# Patient Record
Sex: FEMALE | Race: WHITE
[De-identification: names, ages, dates, MRNs, and addresses within clinical notes are randomized per-mention and may not be internally consistent; named-entity substitution may affect disease eponyms.]

---

## 2017-01-04 PROBLEM — R41.3 MEMORY LOSS: Status: ACTIVE | Noted: 2017-01-04

## 2017-01-04 PROBLEM — I10 ESSENTIAL HYPERTENSION: Status: ACTIVE | Noted: 2017-01-04

## 2017-10-10 PROBLEM — H90.3 SENSORINEURAL HEARING LOSS, BILATERAL: Status: ACTIVE | Noted: 2017-10-10

## 2018-03-14 PROBLEM — E55.9 VITAMIN D DEFICIENCY: Status: ACTIVE | Noted: 2018-03-14

## 2018-03-16 PROCEDURE — 81001 URINALYSIS AUTO W/SCOPE: CPT | Performed by: INTERNAL MEDICINE

## 2018-03-16 PROCEDURE — 87086 URINE CULTURE/COLONY COUNT: CPT | Performed by: INTERNAL MEDICINE

## 2018-04-03 PROBLEM — M17.11 PRIMARY OSTEOARTHRITIS OF RIGHT KNEE: Status: ACTIVE | Noted: 2018-04-03

## 2019-01-08 PROBLEM — F41.9 ANXIETY AND DEPRESSION: Status: ACTIVE | Noted: 2019-01-08

## 2019-01-08 PROBLEM — M17.11 PRIMARY OSTEOARTHRITIS OF RIGHT KNEE: Status: RESOLVED | Noted: 2018-04-03 | Resolved: 2019-01-08

## 2019-01-08 PROBLEM — F32.A ANXIETY AND DEPRESSION: Status: ACTIVE | Noted: 2019-01-08

## 2019-07-17 ENCOUNTER — HOSPITAL (OUTPATIENT)
Dept: OTHER | Age: 79
End: 2019-07-17

## 2019-09-24 PROBLEM — G30.1 LATE ONSET ALZHEIMER'S DISEASE WITHOUT BEHAVIORAL DISTURBANCE (HCC): Status: ACTIVE | Noted: 2019-09-24

## 2019-09-24 PROBLEM — F02.80 LATE ONSET ALZHEIMER'S DISEASE WITHOUT BEHAVIORAL DISTURBANCE (HCC): Status: ACTIVE | Noted: 2019-09-24

## 2020-01-13 PROBLEM — R41.3 MEMORY LOSS: Status: RESOLVED | Noted: 2017-01-04 | Resolved: 2020-01-13

## 2020-06-02 ENCOUNTER — APPOINTMENT (OUTPATIENT)
Dept: CT IMAGING | Facility: HOSPITAL | Age: 80
End: 2020-06-02
Attending: EMERGENCY MEDICINE
Payer: MEDICARE

## 2020-06-02 ENCOUNTER — HOSPITAL ENCOUNTER (EMERGENCY)
Facility: HOSPITAL | Age: 80
Discharge: HOME | End: 2020-06-02
Attending: EMERGENCY MEDICINE
Payer: MEDICARE

## 2020-06-02 ENCOUNTER — TELEPHONE (OUTPATIENT)
Dept: OTOLARYNGOLOGY | Facility: CLINIC | Age: 80
End: 2020-06-02

## 2020-06-02 VITALS
RESPIRATION RATE: 16 BRPM | DIASTOLIC BLOOD PRESSURE: 80 MMHG | OXYGEN SATURATION: 99 % | WEIGHT: 230.81 LBS | TEMPERATURE: 98 F | HEIGHT: 64 IN | BODY MASS INDEX: 39.4 KG/M2 | SYSTOLIC BLOOD PRESSURE: 140 MMHG | HEART RATE: 72 BPM

## 2020-06-02 DIAGNOSIS — S01.21XA NASAL LACERATION, INITIAL ENCOUNTER: ICD-10-CM

## 2020-06-02 DIAGNOSIS — S00.83XA CONTUSION OF FACE, INITIAL ENCOUNTER: Primary | ICD-10-CM

## 2020-06-02 DIAGNOSIS — S02.85XA ORBITAL FRACTURE, CLOSED, INITIAL ENCOUNTER (HCC): ICD-10-CM

## 2020-06-02 DIAGNOSIS — R04.0 LEFT-SIDED EPISTAXIS: ICD-10-CM

## 2020-06-02 DIAGNOSIS — S02.2XXB OPEN FRACTURE OF NASAL BONE, INITIAL ENCOUNTER: ICD-10-CM

## 2020-06-02 PROCEDURE — 30903 CONTROL OF NOSEBLEED: CPT

## 2020-06-02 PROCEDURE — 70450 CT HEAD/BRAIN W/O DYE: CPT | Performed by: EMERGENCY MEDICINE

## 2020-06-02 PROCEDURE — 21310 HC CLOSED TX NASAL BONE FX WITHOUT MANIPULATION: CPT

## 2020-06-02 PROCEDURE — 12014 RPR F/E/E/N/L/M 5.1-7.5 CM: CPT

## 2020-06-02 PROCEDURE — 70486 CT MAXILLOFACIAL W/O DYE: CPT | Performed by: EMERGENCY MEDICINE

## 2020-06-02 PROCEDURE — 90471 IMMUNIZATION ADMIN: CPT

## 2020-06-02 PROCEDURE — 99284 EMERGENCY DEPT VISIT MOD MDM: CPT

## 2020-06-02 RX ORDER — CLINDAMYCIN HYDROCHLORIDE 300 MG/1
300 CAPSULE ORAL 3 TIMES DAILY
Qty: 21 CAPSULE | Refills: 0 | Status: SHIPPED | OUTPATIENT
Start: 2020-06-02 | End: 2020-06-09

## 2020-06-02 RX ORDER — CLINDAMYCIN HYDROCHLORIDE 300 MG/1
300 CAPSULE ORAL ONCE
Status: COMPLETED | OUTPATIENT
Start: 2020-06-02 | End: 2020-06-02

## 2020-06-02 RX ORDER — LIDOCAINE HYDROCHLORIDE 10 MG/ML
20 INJECTION, SOLUTION EPIDURAL; INFILTRATION; INTRACAUDAL; PERINEURAL ONCE
Status: COMPLETED | OUTPATIENT
Start: 2020-06-02 | End: 2020-06-02

## 2020-06-02 RX ORDER — TRANEXAMIC ACID 100 MG/ML
10 INJECTION, SOLUTION INTRAVENOUS ONCE
Status: COMPLETED | OUTPATIENT
Start: 2020-06-02 | End: 2020-06-02

## 2020-06-02 RX ORDER — LIDOCAINE HYDROCHLORIDE 10 MG/ML
INJECTION, SOLUTION EPIDURAL; INFILTRATION; INTRACAUDAL; PERINEURAL
Status: COMPLETED
Start: 2020-06-02 | End: 2020-06-02

## 2020-06-02 RX ORDER — LIDOCAINE HYDROCHLORIDE AND EPINEPHRINE 20; 5 MG/ML; UG/ML
20 INJECTION, SOLUTION EPIDURAL; INFILTRATION; INTRACAUDAL; PERINEURAL ONCE
Status: COMPLETED | OUTPATIENT
Start: 2020-06-02 | End: 2020-06-02

## 2020-06-02 NOTE — ED NOTES
Care assumed from triage Ambulates to room with steady gait Alert and interactive Presents after mechanical fall while ascending carpeted  stairs striking nose on riser + L nare epistaxis L lateral irregular 3cm laceration + 1cm laceration at tip of nose B

## 2020-06-02 NOTE — ED INITIAL ASSESSMENT (HPI)
Mechanical fall from standing, has bleeding from nose, as well as two lacs to nose. Denies LOC. States she hit a carpeted stair.

## 2020-06-02 NOTE — TELEPHONE ENCOUNTER
Daughter calling for appt for pt had a fall and was seen in ER needs nose packing removal and evaluation requesting appt.  Pt nose packing is very bloody right now please advise

## 2020-06-02 NOTE — ED NOTES
L nare rhinorocket intact No active bleeding. Suture line care with mustache/lateral lac DSD applied. Instructions reviewed with return understanding  From  Osvaldo Rizzo Discharged home with plan to follow up with ENT/Opthomology as indicated.  Alert and inte

## 2020-06-02 NOTE — ED PROVIDER NOTES
Patient Seen in: [facility] AND [facility] Emergency Department    History   Patient presents with:  Fall    Stated Complaint: fall, nose injury     HPI    47-year-old female with past medical history of hypertension, dyslipidemia, hypothyroid presenting for evalu Diclofenac Sodium 1 % Transdermal Gel,  Apply 2 g topically 3 (three) times daily as needed (knee pain). Sertraline HCl 100 MG Oral Tab,  Take 2 tablets (200 mg total) by mouth daily.    furosemide 20 MG Oral Tab,  One daily PRN swelling   Multiple Vitami Temp 98.2 °F (36.8 °C) (Oral)   Resp 18   Ht 162.6 cm (5' 4\")   Wt 104.7 kg   LMP  (LMP Unknown)   SpO2 99%   BMI 39.62 kg/m²         Physical Exam   Constitutional: No distress. HEENT: MMM.  Nasal bridge with superficial abrasions; left lateral nasal b right lateral nasal bridge was anesthetized in the usual fashion. The wound was scrubbed, draped and explored to its base with a gloved finger. There were no deep structures involved. No tendon injury was identified.    The wound was repaired with 5-0 prol well-aerated. The mastoids are unremarkable. FACE/ORBITS:    Soft tissue swelling and bubbles of gas involving the forehead and paranasal region.   Severely comminuted nasal fracture involving the bridge and both lateral walls with angulation and mil follow-up. Patient/family comfortable with plan of care and understanding of warning structures for emergent return and understanding of need for ongoing specialist follow-up.     I was wearing at minimum a facemask and eye protection throughout this encou

## 2020-06-02 NOTE — TELEPHONE ENCOUNTER
Pt had rhino rocket placed on 06/01, pt is not on any blood thinners, per pt  no bleeding. Pt scheduled for follow up with [facility].

## 2020-06-08 ENCOUNTER — OFFICE VISIT (OUTPATIENT)
Dept: OTOLARYNGOLOGY | Facility: CLINIC | Age: 80
End: 2020-06-08
Payer: MEDICARE

## 2020-06-08 VITALS
WEIGHT: 230.81 LBS | HEIGHT: 64 IN | BODY MASS INDEX: 39.4 KG/M2 | DIASTOLIC BLOOD PRESSURE: 61 MMHG | TEMPERATURE: 98 F | SYSTOLIC BLOOD PRESSURE: 105 MMHG

## 2020-06-08 DIAGNOSIS — R04.0 EPISTAXIS: Primary | ICD-10-CM

## 2020-06-08 DIAGNOSIS — S09.93XA FACIAL INJURY, INITIAL ENCOUNTER: ICD-10-CM

## 2020-06-08 PROCEDURE — G0463 HOSPITAL OUTPT CLINIC VISIT: HCPCS | Performed by: OTOLARYNGOLOGY

## 2020-06-08 PROCEDURE — 99203 OFFICE O/P NEW LOW 30 MIN: CPT | Performed by: OTOLARYNGOLOGY

## 2020-06-08 NOTE — PROGRESS NOTES
Eva Bustillos is a 78year old female. Patient presents with:  Epistaxis: ED follow up for nose packing removal on 6/2/2020      HISTORY OF PRESENT ILLNESS  6/8/2020  Patient prevents for evaluation of nosebleeds. This is not recurrent.  Recent bleeding file        Attends meetings of clubs or organizations: Not on file        Relationship status: Not on file      Intimate partner violence:        Fear of current or ex partner: Not on file        Emotionally abused: Not on file        Physically abused: N Negative Frequent skin infections, pigment change and rash. Hema/Lymph Negative Easy bleeding and easy bruising.            PHYSICAL EXAM    Temp:  [97.9 °F (36.6 °C)] 97.9 °F (36.6 °C)  BP: (105)/(61) 105/61        Constitutional Normal Overall appearanc ONE TIME DAILY, Disp: 90 tablet, Rfl: 0  •  ALENDRONATE 35 MG Oral Tab, TAKE ONE TABLET BY MOUTH EVERY 7 DAYS., Disp: 12 tablet, Rfl: 0  •  ATORVASTATIN 20 MG Oral Tab, TAKE ONE TABLET BY MOUTH ONE TIME DAILY , Disp: 90 tablet, Rfl: 1  •  OXYBUTYNIN CHLORI thinners, nose picking, and dry conditions. I emphasized the need for humidity and avoidance of noseblowing when the bleeding occurs. .  Recommend: ointment twice a day for 7 days intranasally,  humidifier, avoidance of nose blowing and avoidance of anti-in

## 2020-08-17 ENCOUNTER — OFFICE VISIT (OUTPATIENT)
Dept: OTOLARYNGOLOGY | Facility: CLINIC | Age: 80
End: 2020-08-17
Payer: MEDICARE

## 2020-08-17 VITALS
WEIGHT: 230.81 LBS | BODY MASS INDEX: 39.4 KG/M2 | HEIGHT: 64 IN | DIASTOLIC BLOOD PRESSURE: 61 MMHG | TEMPERATURE: 97 F | SYSTOLIC BLOOD PRESSURE: 96 MMHG

## 2020-08-17 DIAGNOSIS — T81.89XA RETAINED SUTURE, INITIAL ENCOUNTER: Primary | ICD-10-CM

## 2020-08-17 DIAGNOSIS — Z18.9 RETAINED SUTURE, INITIAL ENCOUNTER: Primary | ICD-10-CM

## 2020-08-17 PROCEDURE — 99212 OFFICE O/P EST SF 10 MIN: CPT | Performed by: OTOLARYNGOLOGY

## 2020-08-17 PROCEDURE — G0463 HOSPITAL OUTPT CLINIC VISIT: HCPCS | Performed by: OTOLARYNGOLOGY

## 2020-08-17 NOTE — PROGRESS NOTES
Contreras Redmond is a 78year old female. Patient presents with: Follow - Up: nasal injury happened on 6/20      HISTORY OF PRESENT ILLNESS  6/8/2020  Patient prevents for evaluation of nosebleeds. This is not recurrent.  Recent bleeding last  started exce file        Minutes per session: Not on file      Stress: Not on file    Relationships      Social connections:        Talks on phone: Not on file        Gets together: Not on file        Attends [employer] service: Not on file        Active member of club Dyspnea and wheezing. Cardio Negative Chest pain, irregular heartbeat/palpitations and syncope. GI Negative Abdominal pain and diarrhea. Endocrine Negative Cold intolerance and heat intolerance. Neuro Negative Tremors.    Psych Negative Anxiety and Tab, TAKE ONE TABLET BY MOUTH TWICE DAILY , Disp: 180 tablet, Rfl: 1  •  DONEPEZIL HCL 10 MG Oral Tab, TAKE ONE TABLET BY MOUTH NIGHTLY, Disp: 90 tablet, Rfl: 3  •  LEVOTHYROXINE SODIUM 88 MCG Oral Tab, TAKE ONE TABLET BY MOUTH ONE TIME DAILY, Disp: 90 tab

## 2021-03-09 PROBLEM — I35.0 NONRHEUMATIC AORTIC VALVE STENOSIS: Status: ACTIVE | Noted: 2021-03-09

## 2023-11-29 ENCOUNTER — INITIAL APN SNF VISIT (OUTPATIENT)
Dept: INTERNAL MEDICINE CLINIC | Age: 83
End: 2023-11-29

## 2023-11-29 ENCOUNTER — APPOINTMENT (OUTPATIENT)
Dept: LAB | Age: 83
End: 2023-11-29
Attending: NURSE PRACTITIONER
Payer: MEDICARE

## 2023-11-29 VITALS
DIASTOLIC BLOOD PRESSURE: 57 MMHG | BODY MASS INDEX: 35 KG/M2 | OXYGEN SATURATION: 93 % | SYSTOLIC BLOOD PRESSURE: 102 MMHG | RESPIRATION RATE: 16 BRPM | HEART RATE: 72 BPM | TEMPERATURE: 99 F | WEIGHT: 186.38 LBS

## 2023-11-29 DIAGNOSIS — E06.3 ACQUIRED AUTOIMMUNE HYPOTHYROIDISM: ICD-10-CM

## 2023-11-29 DIAGNOSIS — M25.561 CHRONIC PAIN OF RIGHT KNEE: Primary | ICD-10-CM

## 2023-11-29 DIAGNOSIS — Z78.9 DECREASED ACTIVITIES OF DAILY LIVING (ADL): ICD-10-CM

## 2023-11-29 DIAGNOSIS — F32.A ANXIETY AND DEPRESSION: ICD-10-CM

## 2023-11-29 DIAGNOSIS — I10 ESSENTIAL HYPERTENSION: ICD-10-CM

## 2023-11-29 DIAGNOSIS — G30.1 LATE ONSET ALZHEIMER'S DISEASE WITHOUT BEHAVIORAL DISTURBANCE (HCC): ICD-10-CM

## 2023-11-29 DIAGNOSIS — R53.1 WEAKNESS: ICD-10-CM

## 2023-11-29 DIAGNOSIS — E87.6 HYPOKALEMIA: ICD-10-CM

## 2023-11-29 DIAGNOSIS — F02.80 LATE ONSET ALZHEIMER'S DISEASE WITHOUT BEHAVIORAL DISTURBANCE (HCC): ICD-10-CM
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
## 2023-11-29 DIAGNOSIS — G89.29 CHRONIC PAIN OF RIGHT KNEE: Primary | ICD-10-CM

## 2023-11-29 DIAGNOSIS — F41.9 ANXIETY AND DEPRESSION: ICD-10-CM

## 2023-11-29 PROCEDURE — 99310 SBSQ NF CARE HIGH MDM 45: CPT | Performed by: NURSE PRACTITIONER

## 2023-11-29 PROCEDURE — 1124F ACP DISCUSS-NO DSCNMKR DOCD: CPT | Performed by: NURSE PRACTITIONER

## 2023-11-29 RX ORDER — POTASSIUM CHLORIDE 1500 MG/1
20 TABLET, EXTENDED RELEASE ORAL
COMMUNITY

## 2023-11-29 RX ORDER — POLYETHYLENE GLYCOL 3350 17 G/17G
17 POWDER, FOR SOLUTION ORAL DAILY PRN
COMMUNITY

## 2023-11-29 RX ORDER — DOCUSATE SODIUM 100 MG/1
100 CAPSULE, LIQUID FILLED ORAL 2 TIMES DAILY
COMMUNITY

## 2023-11-29 RX ORDER — ACETAMINOPHEN 325 MG/1
650 TABLET ORAL EVERY 6 HOURS PRN
COMMUNITY

## 2023-11-29 RX ORDER — LIDOCAINE 50 MG/G
1 PATCH TOPICAL EVERY 24 HOURS
COMMUNITY

## 2023-11-29 RX ORDER — TRAMADOL HYDROCHLORIDE 50 MG/1
50 TABLET ORAL EVERY 6 HOURS PRN
COMMUNITY

## 2023-12-01 ENCOUNTER — APPOINTMENT (OUTPATIENT)
Dept: LAB | Age: 83
End: 2023-12-01
Attending: FAMILY MEDICINE
Payer: MEDICARE

## 2023-12-06 ENCOUNTER — SNF VISIT (OUTPATIENT)
Dept: INTERNAL MEDICINE CLINIC | Age: 83
End: 2023-12-06

## 2023-12-06 VITALS
WEIGHT: 188 LBS | TEMPERATURE: 98 F | RESPIRATION RATE: 18 BRPM | DIASTOLIC BLOOD PRESSURE: 64 MMHG | OXYGEN SATURATION: 95 % | BODY MASS INDEX: 36 KG/M2 | SYSTOLIC BLOOD PRESSURE: 132 MMHG | HEART RATE: 70 BPM

## 2023-12-06 DIAGNOSIS — R53.1 WEAKNESS: ICD-10-CM

## 2023-12-06 DIAGNOSIS — I10 ESSENTIAL HYPERTENSION: ICD-10-CM
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
## 2023-12-06 DIAGNOSIS — G30.1 LATE ONSET ALZHEIMER'S DISEASE WITHOUT BEHAVIORAL DISTURBANCE (HCC): ICD-10-CM

## 2023-12-06 DIAGNOSIS — Z78.9 DECREASED ACTIVITIES OF DAILY LIVING (ADL): ICD-10-CM

## 2023-12-06 DIAGNOSIS — G89.29 CHRONIC PAIN OF RIGHT KNEE: Primary | ICD-10-CM

## 2023-12-06 DIAGNOSIS — F02.80 LATE ONSET ALZHEIMER'S DISEASE WITHOUT BEHAVIORAL DISTURBANCE (HCC): ICD-10-CM

## 2023-12-06 DIAGNOSIS — K59.00 CONSTIPATION, UNSPECIFIED CONSTIPATION TYPE: ICD-10-CM

## 2023-12-06 DIAGNOSIS — M25.561 CHRONIC PAIN OF RIGHT KNEE: Primary | ICD-10-CM

## 2023-12-06 PROCEDURE — 99310 SBSQ NF CARE HIGH MDM 45: CPT | Performed by: NURSE PRACTITIONER

## 2023-12-09 ENCOUNTER — APPOINTMENT (OUTPATIENT)
Dept: LAB | Age: 83
End: 2023-12-09
Attending: FAMILY MEDICINE
Payer: MEDICARE

## 2023-12-14 ENCOUNTER — APPOINTMENT (OUTPATIENT)
Dept: LAB | Age: 83
End: 2023-12-14
Attending: FAMILY MEDICINE
Payer: MEDICARE

## 2023-12-15 ENCOUNTER — APPOINTMENT (OUTPATIENT)
Dept: LAB | Age: 83
End: 2023-12-15
Attending: FAMILY MEDICINE
Payer: MEDICARE

## 2023-12-20 ENCOUNTER — SNF VISIT (OUTPATIENT)
Dept: INTERNAL MEDICINE CLINIC | Age: 83
End: 2023-12-20

## 2023-12-20 VITALS
SYSTOLIC BLOOD PRESSURE: 107 MMHG | RESPIRATION RATE: 18 BRPM | OXYGEN SATURATION: 96 % | WEIGHT: 190 LBS | DIASTOLIC BLOOD PRESSURE: 67 MMHG | BODY MASS INDEX: 36 KG/M2 | TEMPERATURE: 97 F | HEART RATE: 66 BPM

## 2023-12-20 DIAGNOSIS — F02.80 LATE ONSET ALZHEIMER'S DISEASE WITHOUT BEHAVIORAL DISTURBANCE (HCC): ICD-10-CM

## 2023-12-20 DIAGNOSIS — G30.1 LATE ONSET ALZHEIMER'S DISEASE WITHOUT BEHAVIORAL DISTURBANCE (HCC): ICD-10-CM

## 2023-12-20 DIAGNOSIS — G89.29 CHRONIC PAIN OF RIGHT KNEE: Primary | ICD-10-CM

## 2023-12-20 DIAGNOSIS — R53.1 WEAKNESS: ICD-10-CM

## 2023-12-20 DIAGNOSIS — M25.561 CHRONIC PAIN OF RIGHT KNEE: Primary | ICD-10-CM

## 2023-12-20 DIAGNOSIS — Z78.9 DECREASED ACTIVITIES OF DAILY LIVING (ADL): ICD-10-CM

## 2023-12-20 DIAGNOSIS — I10 ESSENTIAL HYPERTENSION: ICD-10-CM

## 2023-12-20 PROCEDURE — 99308 SBSQ NF CARE LOW MDM 20: CPT | Performed by: NURSE PRACTITIONER

## 2023-12-22 ENCOUNTER — APPOINTMENT (OUTPATIENT)
Dept: LAB | Age: 83
End: 2023-12-22
Attending: FAMILY MEDICINE
Payer: MEDICARE

## 2023-12-23 ENCOUNTER — APPOINTMENT (OUTPATIENT)
Dept: LAB | Age: 83
End: 2023-12-23
Attending: FAMILY MEDICINE
Payer: MEDICARE

## 2023-12-26 ENCOUNTER — SNF VISIT (OUTPATIENT)
Dept: INTERNAL MEDICINE CLINIC | Age: 83
End: 2023-12-26

## 2023-12-26 VITALS
SYSTOLIC BLOOD PRESSURE: 102 MMHG | DIASTOLIC BLOOD PRESSURE: 62 MMHG | RESPIRATION RATE: 18 BRPM | BODY MASS INDEX: 36 KG/M2 | HEART RATE: 80 BPM | TEMPERATURE: 98 F | WEIGHT: 190 LBS | OXYGEN SATURATION: 94 %

## 2023-12-26 DIAGNOSIS — F02.80 LATE ONSET ALZHEIMER'S DISEASE WITHOUT BEHAVIORAL DISTURBANCE (HCC): ICD-10-CM

## 2023-12-26 DIAGNOSIS — J69.0 ASPIRATION PNEUMONIA OF RIGHT LOWER LOBE DUE TO REGURGITATED FOOD (HCC): Primary | ICD-10-CM

## 2023-12-26 DIAGNOSIS — J10.1 INFLUENZA A: ICD-10-CM

## 2023-12-26 DIAGNOSIS — I10 ESSENTIAL HYPERTENSION: ICD-10-CM

## 2023-12-26 DIAGNOSIS — G30.1 LATE ONSET ALZHEIMER'S DISEASE WITHOUT BEHAVIORAL DISTURBANCE (HCC): ICD-10-CM

## 2023-12-26 DIAGNOSIS — M25.561 CHRONIC PAIN OF RIGHT KNEE: ICD-10-CM

## 2023-12-26 DIAGNOSIS — Z78.9 DECREASED ACTIVITIES OF DAILY LIVING (ADL): ICD-10-CM

## 2023-12-26 DIAGNOSIS — R53.1 WEAKNESS: ICD-10-CM

## 2023-12-26 DIAGNOSIS — G89.29 CHRONIC PAIN OF RIGHT KNEE: ICD-10-CM

## 2023-12-26 PROCEDURE — 99309 SBSQ NF CARE MODERATE MDM 30: CPT | Performed by: NURSE PRACTITIONER

## 2023-12-28 ENCOUNTER — SNF VISIT (OUTPATIENT)
Dept: INTERNAL MEDICINE CLINIC | Age: 83
End: 2023-12-28

## 2023-12-28 VITALS
HEART RATE: 91 BPM | TEMPERATURE: 97 F | WEIGHT: 190 LBS | OXYGEN SATURATION: 96 % | DIASTOLIC BLOOD PRESSURE: 66 MMHG | SYSTOLIC BLOOD PRESSURE: 106 MMHG | BODY MASS INDEX: 36 KG/M2 | RESPIRATION RATE: 20 BRPM

## 2023-12-28 DIAGNOSIS — F32.A ANXIETY AND DEPRESSION: ICD-10-CM

## 2023-12-28 DIAGNOSIS — G30.1 LATE ONSET ALZHEIMER'S DISEASE WITHOUT BEHAVIORAL DISTURBANCE (HCC): ICD-10-CM

## 2023-12-28 DIAGNOSIS — Z78.9 DECREASED ACTIVITIES OF DAILY LIVING (ADL): ICD-10-CM
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
## 2023-12-28 DIAGNOSIS — J10.1 INFLUENZA A: ICD-10-CM

## 2023-12-28 DIAGNOSIS — F41.9 ANXIETY AND DEPRESSION: ICD-10-CM

## 2023-12-28 DIAGNOSIS — J69.0 ASPIRATION PNEUMONIA OF RIGHT LOWER LOBE DUE TO REGURGITATED FOOD (HCC): Primary | ICD-10-CM

## 2023-12-28 DIAGNOSIS — F02.80 LATE ONSET ALZHEIMER'S DISEASE WITHOUT BEHAVIORAL DISTURBANCE (HCC): ICD-10-CM

## 2023-12-28 DIAGNOSIS — M25.561 CHRONIC PAIN OF RIGHT KNEE: ICD-10-CM

## 2023-12-28 DIAGNOSIS — R53.1 WEAKNESS: ICD-10-CM

## 2023-12-28 DIAGNOSIS — G89.29 CHRONIC PAIN OF RIGHT KNEE: ICD-10-CM

## 2023-12-28 PROCEDURE — 99309 SBSQ NF CARE MODERATE MDM 30: CPT | Performed by: NURSE PRACTITIONER

## 2023-12-29 ENCOUNTER — APPOINTMENT (OUTPATIENT)
Dept: LAB | Age: 83
End: 2023-12-29
Attending: FAMILY MEDICINE
Payer: MEDICARE

## 2024-01-05 ENCOUNTER — APPOINTMENT (OUTPATIENT)
Dept: LAB | Age: 84
End: 2024-01-05
Attending: FAMILY MEDICINE
Payer: MEDICARE

## 2024-01-12 ENCOUNTER — APPOINTMENT (OUTPATIENT)
Dept: LAB | Age: 84
End: 2024-01-12
Attending: FAMILY MEDICINE
Payer: MEDICARE

## 2024-01-18 ENCOUNTER — SNF VISIT (OUTPATIENT)
Dept: INTERNAL MEDICINE CLINIC | Age: 84
End: 2024-01-18

## 2024-01-18 DIAGNOSIS — G89.29 CHRONIC PAIN OF RIGHT KNEE: Primary | ICD-10-CM

## 2024-01-18 DIAGNOSIS — G30.1 LATE ONSET ALZHEIMER'S DISEASE WITHOUT BEHAVIORAL DISTURBANCE (HCC): ICD-10-CM

## 2024-01-18 DIAGNOSIS — F02.80 LATE ONSET ALZHEIMER'S DISEASE WITHOUT BEHAVIORAL DISTURBANCE (HCC): ICD-10-CM

## 2024-01-18 DIAGNOSIS — F32.A ANXIETY AND DEPRESSION: ICD-10-CM

## 2024-01-18 DIAGNOSIS — M25.561 CHRONIC PAIN OF RIGHT KNEE: Primary | ICD-10-CM

## 2024-01-18 DIAGNOSIS — R53.1 WEAKNESS: ICD-10-CM

## 2024-01-18 DIAGNOSIS — Z78.9 DECREASED ACTIVITIES OF DAILY LIVING (ADL): ICD-10-CM

## 2024-01-18 DIAGNOSIS — I10 ESSENTIAL HYPERTENSION: ICD-10-CM

## 2024-01-18 DIAGNOSIS — F41.9 ANXIETY AND DEPRESSION: ICD-10-CM

## 2024-01-18 PROCEDURE — 99308 SBSQ NF CARE LOW MDM 20: CPT | Performed by: NURSE PRACTITIONER

## 2024-01-19 ENCOUNTER — APPOINTMENT (OUTPATIENT)
Dept: LAB | Age: 84
End: 2024-01-19
Attending: FAMILY MEDICINE
Payer: MEDICARE

## 2024-01-19 VITALS
WEIGHT: 182.31 LBS | TEMPERATURE: 98 F | OXYGEN SATURATION: 92 % | HEART RATE: 77 BPM | SYSTOLIC BLOOD PRESSURE: 128 MMHG | DIASTOLIC BLOOD PRESSURE: 82 MMHG | RESPIRATION RATE: 20 BRPM | BODY MASS INDEX: 34 KG/M2

## 2024-01-19 NOTE — PROGRESS NOTES
Loren Ryan, 10/11/1940, 83 year old, female    Chief Complaint:    Chief Complaint   Patient presents with    Follow - Up     Weakness, arthritis pain        Subjective:   TODAY:  Loren is seen in the wheelchair today. She is working on a puzzle with activities.   Loren states she is feeling OK today.   No fevers, no chills, no body aches, no fatigue. Right leg pain is still present with movement and standing.   Has a headache today, had some tylenol just recently.   DW nursing..     Denies insomnia, fatigue, fever/chills, cough, SOB, dyspnea, angina, palpitations, n/v, diarrhea, constipation, and urinary sxs.      Objective:  /82   Pulse 77   Temp 97.7 °F (36.5 °C)   Resp 20   Wt 182 lb 4.8 oz (82.7 kg)   LMP  (LMP Unknown)   SpO2 92%   BMI 34.45 kg/m²     PHYSICAL EXAM:  GENERAL HEALTH: well developed, well nourished, in no apparent distress  LINES, TUBES, DRAINS:  none  SKIN: warm, dry  WOUND:  none  EYES: sclera anicteric, conjunctiva normal; there is no nystagmus, no drainage from eyes  HENT: normocephalic; normal nose, no nasal drainage, mucous membranes pink, moist.  NECK:  supple, non tender, FROM  BREAST: deferred  RESPIRATORY: mild crackles, no wheezing, no dyspnea, no cough, RA  CARDIOVASCULAR:  RRR, S1 and S2, no murmurs, no edema  ABDOMEN:   normal active BS+, soft, nondistended; no organomegaly, no masses; nontender, no guarding, no rebound tenderness.  :Deferred  LYMPHATIC:no lymphedema  MUSCULOSKELETAL: no acute synovitis upper or lower extremity  EXTREMITIES/VASCULAR:radial pulses 2+ and dorsalis pedal pulses 2+  NEUROLOGIC:  Alert to self, some events, not date or time, place.  no focal deficits, follows commands  PSYCHIATRIC:  calm, cooperative, mood and affect appropriate to situation      Medications reviewed: Yes      Current Outpatient Medications:     acetaminophen 325 MG Oral Tab, Take 2 tablets (650 mg total) by mouth every 6 (six) hours as needed for Pain., Disp: ,  Rfl:     lidocaine 5 % External Patch, Place 1 patch onto the skin daily. On 12 hours off 12 hours, Disp: , Rfl:     traMADol 50 MG Oral Tab, Take 1 tablet (50 mg total) by mouth in the morning and 1 tablet (50 mg total) before bedtime. And Q6 prn., Disp: , Rfl:     docusate sodium 100 MG Oral Cap, Take 1 capsule (100 mg total) by mouth 2 (two) times daily., Disp: , Rfl:     polyethylene glycol, PEG 3350, 17 g Oral Powd Pack, Take 17 g by mouth daily as needed (constipation)., Disp: , Rfl:     Potassium Chloride ER 20 MEQ Oral Tab CR, Take 20 mEq by mouth daily with breakfast., Disp: , Rfl:     LEVOTHYROXINE 88 MCG Oral Tab, TAKE ONE TABLET BY MOUTH ONE TIME DAILY, Disp: 90 tablet, Rfl: 0    memantine 10 MG Oral Tab, Take 1 tablet (10 mg total) by mouth 2 (two) times daily. MUST BE SEEN FOR ANY FURTHER REFILLS., Disp: 180 tablet, Rfl: 0    oxybutynin 5 MG Oral Tab, Take 1 tablet (5 mg total) by mouth 2 (two) times daily. MUST BE SEEN FOR ANY FURTHER REFILLS., Disp: 180 tablet, Rfl: 0    DONEPEZIL 10 MG Oral Tab, TAKE ONE TABLET BY MOUTH NIGHTLY, Disp: 90 tablet, Rfl: 0    atorvastatin 20 MG Oral Tab, Take 1 tablet (20 mg total) by mouth daily., Disp: 90 tablet, Rfl: 3    losartan 100 MG Oral Tab, Take 1 tablet (100 mg total) by mouth daily., Disp: 90 tablet, Rfl: 3    Sertraline HCl 50 MG Oral Tab, Takes 2 tablets daily, Disp: 180 tablet, Rfl: 3    Cyanocobalamin (VITAMIN B-12 OR), Take by mouth., Disp: , Rfl:     Cholecalciferol (VITAMIN D) 1000 UNITS Oral Cap, Take 1 capsule by mouth daily with breakfast., Disp: , Rfl:     BIOTIN 1000 MCG OR TABS, 1 TABLET DAILY, Disp: , Rfl:     CALTRATE 600+D OR, 1 TABLET DAILY, Disp: , Rfl:     Diclofenac Sodium 1 % Transdermal Gel, Apply 2 g topically 3 (three) times daily as needed (knee pain). (Patient not taking: Reported on 11/29/2023), Disp: 100 g, Rfl: 2    furosemide 20 MG Oral Tab, One daily PRN swelling (Patient not taking: Reported on 11/29/2023), Disp: 30 tablet,  Rfl: 5    betamethasone valerate 0.1 % External Cream, Apply to affected areas BID x 7-10 days (Patient not taking: Reported on 11/29/2023), Disp: 45 g, Rfl: 0      Diagnostics reviewed:    Lab Results   Component Value Date    WBC 5.5 01/12/2024    RBC 3.79 (L) 01/12/2024    HGB 11.4 (L) 01/12/2024    HCT 37.0 01/12/2024    MCV 97.6 01/12/2024    MCH 30.1 01/12/2024    MCHC 30.8 (L) 01/12/2024    RDW 15.7 01/12/2024    .0 (L) 01/12/2024     Lab Results   Component Value Date    GLU 82 01/19/2024    BUN 9 01/19/2024    BUNCREA 13.0 08/10/2020    CREATSERUM 0.68 01/19/2024    ANIONGAP 1 01/19/2024    GFR >59 01/13/2011    CA 8.8 01/19/2024    OSMOCALC 298 (H) 01/19/2024    ALKPHO 129 12/14/2023    AST 26 12/14/2023    ALT 36 12/14/2023    BILT 0.7 12/14/2023    TP 6.2 (L) 12/14/2023    ALB 2.5 (L) 12/14/2023    GLOBULIN 3.7 12/14/2023    AGRATIO 1.8 05/20/2014     01/19/2024    K 4.4 01/19/2024     01/19/2024    CO2 34.0 (H) 01/19/2024       Assessment and plan:    Influenza A, and Pneumonia Right lung base - resolved now  Tamiflu until 12/27  Doxycycline until 12/29      Right knee pain osteoarthritis  Lidoderm patch on 12 off 12  Pain management with tylenol and tramadol BID and prn   Fall prevention strategies       Hypokalemia  KCL 20 MEQ daily  BMP as indicated  monitoring     Alzheimer's Dementia with depression  Aricept 10mg nightly  Namenda 10 mg BID  Zoloft 100 mg daily increased to 125 mg daily  Psych eval and treat appreciated  Encourage ambulation, offer emotional support and physical assistance  Eating improved, weight trending up     HTN, HLD  Losartan 100 mg daily parameters added to hold if SBP < 120  Atorvastatin 20 mg daily  VS qshift and prn   BP controlled     Hypothyroidism  Continue levothyroxine     Overactive bladder   Oxybutynin 5 mg BID     Skin issues  Skin tear  Prompt hygiene , offloading as able  Wound care following  Optimize nutrition     Vitamins and  supplements  Vit D daily  Vit B12 daily  Calcium daily  Biotin daily  B complex daily     Pain management  Tylenol 650 mg PO Q4 prn     Bowel management  Colace BID  Miralax daily   Dulcolax supp prn     DVT prophylaxis  Encourage mobility with therapies     GI prophylaxis  none     Labs  CBC, CMP weekly and prn     Follow Ups:  PCP within 7 days of DC      *Established patient; follow-up mildly complex visit/ greater than 20    22 minutes spent w/ patient and staff, including but not limited to/ reviewing present status, needs, abilities with disciplines, reviewing medical records, vital signs, labs, completing medication reconciliation and entering orders for continued care in [facility].      Note to patient: The 21st Century Cures Act makes medical notes like these available to patients in the interest of transparency. However, this is a medical document intended as peer to peer communication. It is written in medical language and may contain abbreviations or verbiage that are unfamiliar. It may appear blunt or direct. Medical documents are intended to carry relevant information, facts as evident, and the clinical opinion of the practitioner who signs the document.    Celia Fagan, APRN  1/18/24

## 2024-02-12 ENCOUNTER — APPOINTMENT (OUTPATIENT)
Dept: LAB | Age: 84
End: 2024-02-12
Attending: NURSE PRACTITIONER
Payer: MEDICARE

## 2024-02-16 ENCOUNTER — APPOINTMENT (OUTPATIENT)
Dept: LAB | Age: 84
End: 2024-02-16
Attending: FAMILY MEDICINE
Payer: MEDICARE

## 2024-02-16 ENCOUNTER — SNF VISIT (OUTPATIENT)
Dept: INTERNAL MEDICINE CLINIC | Age: 84
End: 2024-02-16

## 2024-02-16 VITALS
HEART RATE: 76 BPM | SYSTOLIC BLOOD PRESSURE: 115 MMHG | RESPIRATION RATE: 17 BRPM | OXYGEN SATURATION: 91 % | WEIGHT: 184 LBS | BODY MASS INDEX: 35 KG/M2 | DIASTOLIC BLOOD PRESSURE: 58 MMHG | TEMPERATURE: 99 F

## 2024-02-16 DIAGNOSIS — G30.1 LATE ONSET ALZHEIMER'S DISEASE WITHOUT BEHAVIORAL DISTURBANCE (HCC): ICD-10-CM

## 2024-02-16 DIAGNOSIS — F02.80 LATE ONSET ALZHEIMER'S DISEASE WITHOUT BEHAVIORAL DISTURBANCE (HCC): ICD-10-CM

## 2024-02-16 DIAGNOSIS — M25.561 CHRONIC PAIN OF RIGHT KNEE: ICD-10-CM

## 2024-02-16 DIAGNOSIS — E87.6 HYPOKALEMIA: ICD-10-CM
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
## 2024-02-16 DIAGNOSIS — F32.A ANXIETY AND DEPRESSION: ICD-10-CM

## 2024-02-16 DIAGNOSIS — R53.1 WEAKNESS: ICD-10-CM

## 2024-02-16 DIAGNOSIS — I10 ESSENTIAL HYPERTENSION: ICD-10-CM

## 2024-02-16 DIAGNOSIS — F41.9 ANXIETY AND DEPRESSION: ICD-10-CM

## 2024-02-16 DIAGNOSIS — G89.29 CHRONIC PAIN OF RIGHT KNEE: ICD-10-CM

## 2024-02-16 PROCEDURE — 99309 SBSQ NF CARE MODERATE MDM 30: CPT | Performed by: NURSE PRACTITIONER

## 2024-02-16 NOTE — PROGRESS NOTES
Loren Lanierlo, 10/11/1940, 83 year old, female    Chief Complaint:    Chief Complaint   Patient presents with    Follow - Up     Fall, increased confusion        Subjective:   TODAY:  Loren is seen in the wheelchair today. She is working on a puzzle with activities. She had a fall overnight, attempting to transfer herself. Per nursing no injuries and neuro checks wnl. Loren states she is feeling OK today. Nothing was injured \"except my pride\". Denies pain. Has not been feeling sick, no nausea, no vomiting, no diarrhea. No trouble with urination or bowel movements. No fevers, no chills, no body aches, no fatigue. She wasn't feeling dizzy or lightheaded, no chest pain or palpitations, no cough or shortness of breath.   DW nursing.UA sent and noted today. Awaiting final culture results/. CBC, CMP earlier in the week were normal. VSS.     Denies insomnia, fatigue, fever/chills, cough, SOB, dyspnea, angina, palpitations, n/v, diarrhea, constipation, and urinary sxs.      Objective:  /58   Pulse 76   Temp 98.6 °F (37 °C)   Resp 17   Wt 184 lb (83.5 kg)   LMP  (LMP Unknown)   SpO2 91%   BMI 34.77 kg/m²     PHYSICAL EXAM:  GENERAL HEALTH: well developed, well nourished, in no apparent distress  LINES, TUBES, DRAINS:  none  SKIN: warm, dry  WOUND:  none  EYES: sclera anicteric, conjunctiva normal; there is no nystagmus, no drainage from eyes  HENT: normocephalic; normal nose, no nasal drainage, mucous membranes pink, moist.  NECK:  supple, non tender, FROM  BREAST: deferred  RESPIRATORY: no crackles, no wheezing, no dyspnea, no cough, RA  CARDIOVASCULAR:  RRR, S1 and S2, no murmurs, no edema  ABDOMEN:   normal active BS+, soft, nondistended; no organomegaly, no masses; nontender, no guarding, no rebound tenderness.  :Deferred  LYMPHATIC:no lymphedema  MUSCULOSKELETAL: no acute synovitis upper or lower extremity, non tender joints, no deformities, no pain  EXTREMITIES/VASCULAR:radial pulses 2+ and dorsalis  pedal pulses 2+  NEUROLOGIC:  Alert to self, some events, not date or time, place.  no focal deficits, follows commands  PSYCHIATRIC:  calm, cooperative, mood and affect appropriate to situation      Medications reviewed: Yes      Current Outpatient Medications:     acetaminophen 325 MG Oral Tab, Take 2 tablets (650 mg total) by mouth every 6 (six) hours as needed for Pain., Disp: , Rfl:     lidocaine 5 % External Patch, Place 1 patch onto the skin daily. On 12 hours off 12 hours, Disp: , Rfl:     traMADol 50 MG Oral Tab, Take 1 tablet (50 mg total) by mouth in the morning and 1 tablet (50 mg total) before bedtime. And Q6 prn., Disp: , Rfl:     docusate sodium 100 MG Oral Cap, Take 1 capsule (100 mg total) by mouth 2 (two) times daily., Disp: , Rfl:     polyethylene glycol, PEG 3350, 17 g Oral Powd Pack, Take 17 g by mouth daily as needed (constipation)., Disp: , Rfl:     Potassium Chloride ER 20 MEQ Oral Tab CR, Take 20 mEq by mouth daily with breakfast., Disp: , Rfl:     LEVOTHYROXINE 88 MCG Oral Tab, TAKE ONE TABLET BY MOUTH ONE TIME DAILY, Disp: 90 tablet, Rfl: 0    memantine 10 MG Oral Tab, Take 1 tablet (10 mg total) by mouth 2 (two) times daily. MUST BE SEEN FOR ANY FURTHER REFILLS., Disp: 180 tablet, Rfl: 0    oxybutynin 5 MG Oral Tab, Take 1 tablet (5 mg total) by mouth 2 (two) times daily. MUST BE SEEN FOR ANY FURTHER REFILLS., Disp: 180 tablet, Rfl: 0    DONEPEZIL 10 MG Oral Tab, TAKE ONE TABLET BY MOUTH NIGHTLY, Disp: 90 tablet, Rfl: 0    atorvastatin 20 MG Oral Tab, Take 1 tablet (20 mg total) by mouth daily., Disp: 90 tablet, Rfl: 3    Sertraline HCl 50 MG Oral Tab, Takes 2 tablets daily (Patient taking differently: Take 2.5 tablets (125 mg total) by mouth daily. Takes 2 tablets daily), Disp: 180 tablet, Rfl: 3    Cyanocobalamin (VITAMIN B-12 OR), Take by mouth., Disp: , Rfl:     Cholecalciferol (VITAMIN D) 1000 UNITS Oral Cap, Take 1 capsule by mouth daily with breakfast., Disp: , Rfl:     BIOTIN 1000  MCG OR TABS, 1 TABLET DAILY, Disp: , Rfl:     CALTRATE 600+D OR, 1 TABLET DAILY, Disp: , Rfl:     losartan 100 MG Oral Tab, Take 1 tablet (100 mg total) by mouth daily. (Patient taking differently: Take 0.5 tablets (50 mg total) by mouth daily.), Disp: 90 tablet, Rfl: 3    Diclofenac Sodium 1 % Transdermal Gel, Apply 2 g topically 3 (three) times daily as needed (knee pain). (Patient not taking: Reported on 11/29/2023), Disp: 100 g, Rfl: 2    furosemide 20 MG Oral Tab, One daily PRN swelling (Patient not taking: Reported on 11/29/2023), Disp: 30 tablet, Rfl: 5    betamethasone valerate 0.1 % External Cream, Apply to affected areas BID x 7-10 days (Patient not taking: Reported on 11/29/2023), Disp: 45 g, Rfl: 0      Diagnostics reviewed:    Lab Results   Component Value Date    WBC 5.8 02/12/2024    RBC 4.13 02/12/2024    HGB 12.5 02/12/2024    HCT 40.9 02/12/2024    MCV 99.0 02/12/2024    MCH 30.3 02/12/2024    MCHC 30.6 (L) 02/12/2024    RDW 15.8 02/12/2024    .0 (L) 02/12/2024     Lab Results   Component Value Date    GLU 87 02/12/2024    BUN 13 02/12/2024    BUNCREA 13.0 08/10/2020    CREATSERUM 0.65 02/12/2024    ANIONGAP 1 02/12/2024    GFR >59 01/13/2011    CA 8.6 02/12/2024    OSMOCALC 301 (H) 02/12/2024    ALKPHO 73 02/12/2024    AST 12 (L) 02/12/2024    ALT 12 (L) 02/12/2024    BILT 0.4 02/12/2024    TP 5.7 (L) 02/12/2024    ALB 2.7 (L) 02/12/2024    GLOBULIN 3.0 02/12/2024    AGRATIO 1.8 05/20/2014     (H) 02/12/2024    K 4.4 02/12/2024     02/12/2024    CO2 35.0 (H) 02/12/2024     Lab Results   Component Value Date    URINECOLOR Yellow 10/08/2015    APPEARANCE Clear 10/08/2015    SPECGRAVITY 1.017 02/16/2024    BILIRUBIN Negative 10/08/2015    BLOODU Negative 10/08/2015    PHURINE 5.5 02/16/2024    UROBILIN 1.0 10/08/2015    NITRITE Negative 02/16/2024    WBCUR 21-50 (A) 02/16/2024    RBCUR 0-2 02/16/2024    EPIUR Few (A) 02/16/2024    BACUR None Seen 02/16/2024     Urine culture  pending today.    Assessment and plan:    Fall transferring herself  No injury noted, no complaint of pain  Monitoring per nurses  UA noted, culture pending  VSS, labs 2/12/24 stable, no s/s acute infection  Await urine culture  Medications reviewed and appropriate at this time  Monitor for safety and fall prevention    Right knee pain osteoarthritis  Lidoderm patch on 12 off 12  Pain management with tylenol and tramadol BID and prn   Fall prevention strategies       Hypokalemia  KCL 20 MEQ daily  BMP as indicated  monitoring     Alzheimer's Dementia with depression  Aricept 10mg nightly  Namenda 10 mg BID  Zoloft 125 mg daily  Psych eval and treat appreciated  Encourage ambulation, offer emotional support and physical assistance  Eating improved, weight trending up     HTN, HLD  Losartan 50 mg daily parameters added to hold if SBP < 110  Atorvastatin 20 mg daily  VS qshift and prn   BP controlled     Hypothyroidism  Continue levothyroxine  TSH yearly and prn     Overactive bladder   Oxybutynin 5 mg BID     Vitamins and supplements  Vit D daily  Vit B12 daily  Calcium daily  Biotin daily  B complex daily     Pain management  Tylenol 650 mg PO Q4 prn     Bowel management  Colace BID  Miralax daily   Dulcolax supp prn     DVT prophylaxis  Encourage mobility with therapies     GI prophylaxis  none       *Established patient; follow-up moderately complex visit/ greater than 30     38 minutes spent w/ patient and staff, including but not limited to/ reviewing present status, needs, abilities with disciplines, reviewing medical records, vital signs, labs, completing medication reconciliation and entering orders for continued care in [facility].    Note to patient: The 21st Century Cures Act makes medical notes like these available to patients in the interest of transparency. However, this is a medical document intended as peer to peer communication. It is written in medical language and may contain abbreviations or verbiage that  are unfamiliar. It may appear blunt or direct. Medical documents are intended to carry relevant information, facts as evident, and the clinical opinion of the practitioner who signs the document.    Celia Fagan, APRN  2/16/24

## 2024-02-27 ENCOUNTER — SNF DISCHARGE (OUTPATIENT)
Dept: INTERNAL MEDICINE CLINIC | Age: 84
End: 2024-02-27

## 2024-02-27 VITALS
SYSTOLIC BLOOD PRESSURE: 101 MMHG | TEMPERATURE: 98 F | OXYGEN SATURATION: 92 % | HEART RATE: 77 BPM | DIASTOLIC BLOOD PRESSURE: 67 MMHG | RESPIRATION RATE: 16 BRPM | BODY MASS INDEX: 35 KG/M2 | WEIGHT: 184 LBS

## 2024-02-27 DIAGNOSIS — G30.1 LATE ONSET ALZHEIMER'S DISEASE WITHOUT BEHAVIORAL DISTURBANCE (HCC): ICD-10-CM

## 2024-02-27 DIAGNOSIS — G89.29 CHRONIC PAIN OF RIGHT KNEE: ICD-10-CM

## 2024-02-27 DIAGNOSIS — F32.A ANXIETY AND DEPRESSION: ICD-10-CM

## 2024-02-27 DIAGNOSIS — E06.3 ACQUIRED AUTOIMMUNE HYPOTHYROIDISM: ICD-10-CM

## 2024-02-27 DIAGNOSIS — F41.9 ANXIETY AND DEPRESSION: ICD-10-CM

## 2024-02-27 DIAGNOSIS — M25.561 CHRONIC PAIN OF RIGHT KNEE: ICD-10-CM

## 2024-02-27 DIAGNOSIS — I10 ESSENTIAL HYPERTENSION: Primary | ICD-10-CM

## 2024-02-27 DIAGNOSIS — F02.80 LATE ONSET ALZHEIMER'S DISEASE WITHOUT BEHAVIORAL DISTURBANCE (HCC): ICD-10-CM

## 2024-02-27 DIAGNOSIS — R53.1 WEAKNESS: ICD-10-CM

## 2024-02-27 DIAGNOSIS — E87.6 HYPOKALEMIA: ICD-10-CM

## 2024-02-27 PROCEDURE — 99316 NF DSCHRG MGMT 30 MIN+: CPT | Performed by: NURSE PRACTITIONER

## 2024-02-27 NOTE — PROGRESS NOTES
Loren Ryan, 10/11/1940, 83 year old, female is being discharged from Facility: [facility]      DISCHARGE SUMMARY    Long term care now discharging to home of her daughter.                       Subjective:  Loren is seen sitting up in her wheelchair today. She is knitting. She is feeling well. DW her going home with her daughter later this week. She will be sad to go missing the friends she has made here. She is physically fells better than she has in a long time she states. DW nursing.     Vital signs:  /67   Pulse 77   Temp 97.9 °F (36.6 °C)   Resp 16   Wt 184 lb (83.5 kg)   LMP  (LMP Unknown)   SpO2 92%   BMI 34.77 kg/m²     ROS and Physical Exam:         REVIEW OF SYSTEMS:  GENERAL HEALTH:feels well otherwise  SKIN: denies any unusual skin lesions or rashes  WOUNDS: none   EYES:no visual complaints or deficits  HENT: denies nasal congestion, sinus pain or sore throat;  RESPIRATORY: denies shortness of breath, wheezing or cough   CARDIOVASCULAR:denies chest pain, no palpitations , denies cough  GI: denies nausea, vomiting, constipation, diarrhea; no rectal bleeding; no heartburn  Gu: No urinary frequency, urgency or dysuria  MUSCULOSKELETAL:no joint complaints upper or lower extremities  NEURO:no sensory or motor complaint  PSYCHE: no symptoms of depression or anxiety  HEMATOLOGY:denies bruising, denies excessive bleeding  ENDOCRINE: denies excessive thirst or urination; denies unexpected wt gain or wt loss  ALLERGY/IMM.: denies food or seasonal allergies    PHYSICAL EXAM:  GENERAL HEALTH: well developed, well nourished, in no apparent distress  LINES, TUBES, DRAINS:  none  SKIN: warm, dry  WOUND:  none  EYES: sclera anicteric, conjunctiva normal; there is no nystagmus, no drainage from eyes  HENT: normocephalic; normal nose, no nasal drainage, mucous membranes pink, moist.  NECK:  supple, non tender, FROM  BREAST: deferred  RESPIRATORY: no crackles, no wheezing, no dyspnea,  no cough, RA  CARDIOVASCULAR:  RRR, S1 and S2, no murmurs, no edema  ABDOMEN:   normal active BS+, soft, nondistended; no organomegaly, no masses; nontender, no guarding, no rebound tenderness.  :Deferred  LYMPHATIC:no lymphedema  MUSCULOSKELETAL: no acute synovitis upper or lower extremity, non tender joints, no deformities, no pain  EXTREMITIES/VASCULAR:radial pulses 2+ and dorsalis pedal pulses 2+  NEUROLOGIC:  Alert to self, some events, not date or time, place.  no focal deficits, follows commands  PSYCHIATRIC:  calm, cooperative, mood and affect appropriate to situation    Skilled Nursing Facility Course:    Loren admitted to the [facility] for short term rehab and stayed on as private pay resident. She is doing well at this time and family is going to take her home. Home health care has been ordered through [facility].  Medically cleared for anticipated discharge  IL  checked and OK for narcotics prescribed.  Home with home care services RN/PT/OT/ST/Bath Aide  Equipment per PT recommendations: WC/Walker    Most recent lab results:  Lab Results   Component Value Date    WBC 5.8 02/12/2024    RBC 4.13 02/12/2024    HGB 12.5 02/12/2024    HCT 40.9 02/12/2024    MCV 99.0 02/12/2024    MCH 30.3 02/12/2024    MCHC 30.6 (L) 02/12/2024    RDW 15.8 02/12/2024    .0 (L) 02/12/2024     Lab Results   Component Value Date    GLU 87 02/12/2024    BUN 13 02/12/2024    BUNCREA 13.0 08/10/2020    CREATSERUM 0.65 02/12/2024    ANIONGAP 1 02/12/2024    GFR >59 01/13/2011    CA 8.6 02/12/2024    OSMOCALC 301 (H) 02/12/2024    ALKPHO 73 02/12/2024    AST 12 (L) 02/12/2024    ALT 12 (L) 02/12/2024    BILT 0.4 02/12/2024    TP 5.7 (L) 02/12/2024    ALB 2.7 (L) 02/12/2024    GLOBULIN 3.0 02/12/2024    AGRATIO 1.8 05/20/2014     (H) 02/12/2024    K 4.4 02/12/2024     02/12/2024    CO2 35.0 (H) 02/12/2024   Urine Culture, Routine  Order: [id number]  Collected 2/16/2024  6:00 AM       Status: Final result       Dx:  History of falling    Specimen Information: Urine, clean catch   0 Result Notes  URINE CULTURE No Growth at 18-24 hrs.           Resulting Agency: [facility] ([facility])           Specimen Collected: 02/16/24  6:00 AM Last Resulted: 02/17/24  3:13 PM             Discharge Diagnoses w/ current management:    Fall transferring herself  No injury noted, no complaint of pain  Monitoring per nurses  UA noted, culture negative  VSS, labs 2/12/24 stable, no s/s acute infection  Medications reviewed and appropriate at this time  Monitor for safety and fall prevention     Right knee pain osteoarthritis  Lidoderm patch on 12 off 12  Pain management with tylenol and tramadol BID and prn   Fall prevention strategies       Hypokalemia  KCL 20 MEQ daily  BMP as indicated  monitoring     Alzheimer's Dementia with depression  Aricept 10mg nightly  Namenda 10 mg BID  Zoloft 125 mg daily  Psych eval and treat appreciated  Encourage ambulation, offer emotional support and physical assistance  Eating improved, weight trending up     HTN, HLD  Losartan 50 mg daily parameters added to hold if SBP < 110  Atorvastatin 20 mg daily  VS qshift and prn   BP controlled     Hypothyroidism  Continue levothyroxine  TSH yearly and prn     Overactive bladder   Oxybutynin 5 mg BID     Vitamins and supplements  Vit D daily  Vit B12 daily  Calcium daily  Biotin daily  B complex daily     Pain management  Tylenol 650 mg PO Q4 prn     Bowel management  Colace BID  Miralax daily   Dulcolax supp prn     DVT prophylaxis  Encourage mobility with therapies     GI prophylaxis  none     Medication Reconciliation Completed:  Yes    Follow Up Visits:  PCP within 7 days of Discharge      [id number]  spent in coordination of care in preparation for discharge.    Note to patient: The 21st Century Cures Act makes medical notes like these available to patients in the interest of transparency. However, this is a medical document intended as peer to peer communication. It is  written in medical language and may contain abbreviations or verbiage that are unfamiliar. It may appear blunt or direct. Medical documents are intended to carry relevant information, facts as evident, and the clinical opinion of the practitioner who signs the document.      Celia Fagan, FRANSISCA  2/27/2024  12:24 PM